# Patient Record
Sex: MALE | Race: WHITE
[De-identification: names, ages, dates, MRNs, and addresses within clinical notes are randomized per-mention and may not be internally consistent; named-entity substitution may affect disease eponyms.]

---

## 2017-02-14 NOTE — [facility]
BRAIN WO/W CONTRAST 

 

 

Indication: Reason For Study Reason: FOLLOW UP BRAIN MASS WITH SURGERY / 

Spl. Instructions: Prev CT and MR sent to PACS. 8cc Gadavist / History: 

 

 

COMPARISON: MRI brain from 11/15/2016 

 

 

TECHNIQUE: Axial diffusion weighted imaging was obtained. Additional 

sagittal T1, axial T1, axial FLAIR, and axial T2 weighted imaging of the 

brain was also performed. Postcontrast T1 weighted imaging was also 

performed after intravenous administration of gadolinium based contrast. 

 

FINDINGS: 

There are postsurgical changes of a suboccipital craniotomy. There has 

been at least partial resection of the right cerebellar cystic mass. There

is however, some residual enhancement is noted along the inferior margin 

of the operative bed. There also some residual blood products along the 

margin of the operative bed as well. There is no surrounding vasogenic 

edema. No new enhancing lesion is identified. No mass effect upon the 4th 

ventricle. No hydrocephalus. Arterial flow voids at the level of the skull

base are maintained. Globes and orbits are within normal limits. Paranasal

sinuses and mastoid air cells are clear. 

 

Impression: 

 

Postsurgical changes from a suboccipital craniotomy and resection of the 

right cerebellar mass. There is some residual enhancement along the 

inferior margin of the operative bed. Continued followup is recommended. 

No mass effect or abnormal edema. 

 

 

Electronically signed by: Mike Montejo (Feb 14, 2017 13:13:17)

## 2017-03-16 VITALS
DIASTOLIC BLOOD PRESSURE: 91 MMHG | DIASTOLIC BLOOD PRESSURE: 91 MMHG | SYSTOLIC BLOOD PRESSURE: 146 MMHG | SYSTOLIC BLOOD PRESSURE: 146 MMHG

## 2017-03-16 NOTE — PHYS DOC
Past Medical History


Past Medical History:  Hypertension, Kidney Stone, Other


Additional Past Medical Histor:  Rheumatoid arthritis, brain tumor


Past Surgical History:  Other


Additional Past Surgical Histo:  brain tumor


Alcohol Use:  None


Drug Use:  None





Adult General


Chief Complaint


Chief Complaint:  NEURO SYMPTOMS/DEFICITS





HPI


HPI


Patient is a 67  year old male who presents with complaint of slurring of 

speech and confusion started approximately 10:45 AM. The patient was coming to 

[facility] for continued therapy. Patient has history of right 

cerebellar tumor status post resection in 2016. Patient states that he 

has had residual problems with right hand shaking and balance problems ever 

since. The patient states that he currently feels better right now. The patient'

s wife states that his speech still seems to be altered from his baseline, 

stating that "he seems sleepy when he talks." Upon arrival to physical therapy, 

the patient was ordered to come to the emergency department for evaluation by 

the therapist due to his slurring of speech. The patient denies any other 

associated symptoms at this time. Patient states that he feels anxious and 

thinks that this may be playing a role in his symptoms.





Review of Systems


Review of Systems





Constitutional: Denies fever or chills []


Eyes: Denies change in visual acuity, redness, or eye pain []


HENT: Denies nasal congestion or sore throat []


Respiratory: Denies cough or shortness of breath []


Cardiovascular: Denies chest pain or edema []


GI: Denies abdominal pain, nausea, vomiting, bloody stools or diarrhea []


: Denies dysuria or hematuria []


Musculoskeletal: Denies back pain or joint pain []


Integument: Denies rash or skin lesions []


Neurologic: Slurring of speech, tingling of scalp []





Current Medications


Current Medications





 Current Medications








 Medications


  (Trade)  Dose


 Ordered  Sig/Stephanie  Start Time


 Stop Time Status Last Admin


Dose Admin


 


 Hydralazine HCl


  (Apresoline)  10 mg  1X  ONCE  3/16/17 12:00


 3/16/17 12:01 DC  


 


 


 Sodium Chloride


  (Iv Sodium


 Chloride 0.9%


 1000ml Bag)  1,000 ml @ 


 100 mls/hr  Q10H  3/16/17 11:52


 3/16/17 13:50 DC  


 











Allergies


Allergies





 Allergies








Coded Allergies Type Severity Reaction Last Updated Verified


 


  No Known Drug Allergies    8/11/15 No











Physical Exam


Physical Exam





Constitutional: Alert, afebrile, no acute distress. []


HENT: Normocephalic, atraumatic, bilateral external ears normal, oropharynx 

moist, no oral exudates, nose normal. []


Eyes: PERRLA, EOMI, conjunctiva normal, no discharge. [] 


Neck: Normal range of motion, no tenderness, supple, no stridor. [] 


Cardiovascular:Heart rate regular rhythm, no murmur []


Lungs & Thorax:  Bilateral breath sounds clear to auscultation []


Abdomen: Bowel sounds normal, soft, no tenderness, no masses, no pulsatile 

masses. [] 


Skin: Warm, dry, no erythema, no rash. [] 


Back: No tenderness, no CVA tenderness. [] 


Extremities: No tenderness, no cyanosis, no clubbing, ROM intact, no edema. [] 


Neurologic: Alert and oriented X 3, normal motor function, normal sensory 

function, intention tremor in the right upper extremity. []





Current Patient Data


Vital Signs





 Vital Signs








  Date Time  Temp Pulse Resp B/P Pulse Ox O2 Delivery O2 Flow Rate FiO2


 


3/16/17 13:32  66 18 146/91 96 Room Air  


 


3/16/17 11:41 98.4       





 98.4       








Lab Values





 Laboratory Tests








Test


  3/16/17


11:51 3/16/17


12:30 3/16/17


12:50


 


Glucose (Fingerstick)


  137mg/dL


(70-99)  H 


  


 


 


White Blood Count


  


  5.7x10^3/uL


(4.0-11.0) 


 


 


Red Blood Count


  


  5.40x10^6/uL


(4.30-5.70) 


 


 


Hemoglobin


  


  14.7g/dL


(13.0-17.5) 


 


 


Hematocrit


  


  44.0%


(39.0-53.0) 


 


 


Mean Corpuscular Volume  82fL ()   


 


Mean Corpuscular Hemoglobin  27pg (25-35)   


 


Mean Corpuscular Hemoglobin


Concent 


  33g/dL (31-37)


  


 


 


Red Cell Distribution Width


  


  13.9%


(11.5-14.5) 


 


 


Platelet Count


  


  157x10^3/uL


(140-400) 


 


 


Neutrophils (%) (Auto)  49% (31-73)   


 


Lymphocytes (%) (Auto)  35% (24-48)   


 


Monocytes (%) (Auto)  12% (0-9)  H 


 


Eosinophils (%) (Auto)  3% (0-3)   


 


Basophils (%) (Auto)  1% (0-3)   


 


Neutrophils # (Auto)


  


  2.8x10^3uL


(1.8-7.7) 


 


 


Lymphocytes # (Auto)


  


  2.0x10^3/uL


(1.0-4.8) 


 


 


Monocytes # (Auto)


  


  0.7x10^3/uL


(0.0-1.1) 


 


 


Eosinophils # (Auto)


  


  0.2x10^3/uL


(0.0-0.7) 


 


 


Basophils # (Auto)


  


  0.1x10^3/uL


(0.0-0.2) 


 


 


Prothrombin Time


  


  12.5SEC


(11.7-14.0) 


 


 


Prothrombin Time INR  1.0 (0.8-1.1)   


 


PTT  27SEC (24-38)   


 


Sodium Level


  


  145mmol/L


(136-145) 


 


 


Potassium Level


  


  4.0mmol/L


(3.5-5.1) 


 


 


Chloride Level


  


  108mmol/L


()  H 


 


 


Carbon Dioxide Level


  


  27mmol/L


(21-32) 


 


 


Anion Gap  10 (6-14)   


 


Blood Urea Nitrogen


  


  16mg/dL (8-26)


  


 


 


Creatinine


  


  0.8mg/dL


(0.7-1.3) 


 


 


Estimated GFR


(Cockcroft-Gault) 


  97.0  


  


 


 


BUN/Creatinine Ratio  20 (6-20)   


 


Glucose Level


  


  141mg/dL


(70-99)  H 


 


 


Calcium Level


  


  9.2mg/dL


(8.5-10.1) 


 


 


Magnesium Level


  


  1.9mg/dL


(1.8-2.4) 


 


 


Total Bilirubin


  


  0.5mg/dL


(0.2-1.0) 


 


 


Aspartate Amino Transferase


(AST) 


  29U/L (15-37)  


  


 


 


Alanine Aminotransferase (ALT)  42U/L (16-63)   


 


Alkaline Phosphatase


  


  108U/L


() 


 


 


Total Protein


  


  6.6g/dL


(6.4-8.2) 


 


 


Albumin


  


  3.9g/dL


(3.4-5.0) 


 


 


Albumin/Globulin Ratio  1.4 (1.0-1.7)   


 


Urine Collection Type   Unknown  


 


Urine Color   Yellow  


 


Urine Clarity   Clear  


 


Urine pH   6.5  


 


Urine Specific Gravity   1.010  


 


Urine Protein


  


  


  Negativemg/dL


(NEG-TRACE)


 


Urine Glucose (UA)


  


  


  Negativemg/dL


(NEG)


 


Urine Ketones (Stick)


  


  


  Negativemg/dL


(NEG)


 


Urine Blood


  


  


  Negative (NEG)


 


 


Urine Nitrite


  


  


  Negative (NEG)


 


 


Urine Bilirubin


  


  


  Negative (NEG)


 


 


Urine Urobilinogen Dipstick


  


  


  0.2mg/dL (0.2


mg/dL)


 


Urine Leukocyte Esterase


  


  


  Negative (NEG)


 


 


Urine RBC   0/HPF (0-2)  


 


Urine WBC   0/HPF (0-4)  


 


Urine Bacteria   0/HPF (0-FEW)  


 


Urine Opiates Screen   Neg (NEG)  


 


Urine Methadone Screen   Neg (NEG)  


 


Urine Barbiturates   Neg (NEG)  


 


Urine Phencyclidine Screen   Neg (NEG)  


 


Urine


Amphetamine/Methamphetamine 


  


  Neg (NEG)  


 


 


Urine Benzodiazepines Screen   Neg (NEG)  


 


Urine Cocaine Screen   Neg (NEG)  


 


Urine Cannabinoids Screen   Neg (NEG)  


 


Urine Ethyl Alcohol   Neg (NEG)  





 Laboratory Tests


3/16/17 12:30








 Laboratory Tests


3/16/17 12:30











EKG


EKG


Interpreted by me: Heart rate 69, sinus rhythm, normal intervals, normal axis, 

no acute ST/T-wave abnormalities present []





Radiology/Procedures


Radiology/Procedures





 [facility]


 [address] [place] [address]


 [phone]


 


 IMAGING REPORT





 Signed





PATIENT: JOVAN HEARN ACCOUNT: [id number] MRN#: [id number]


: 1950 LOCATION: ER AGE: 67


SEX: M EXAM DT: 17 ACCESSION#: [id number]


STATUS: REG ER ORD. PHYSICIAN: KOURTNEY PA MD 


REASON: code stroke


PROCEDURE: HEAD WO CONTRAST











Examination: CT head without contrast





History: History of code stroke.





 Comparison: MRI brain from 2017








PQRS Compliance Statement:





One or more of the following individualized dose reduction techniques were


utilized for this examination:


1. Automated exposure control


2. Adjustment of the mA and/or kV according to patient size


3. Use of iterative reconstruction technique





Findings:





There is no evidence of midline shift. There is no acute intracranial bleed or


extra axial fluid collection identified.





Linear hypodensity identified in the right cerebellum  similar to prior MRI


likely surgical changes of suboccipital craniotomy and resection of


right-sided cerebellar mass identified. Mild bilateral periventricular white


matter hypodensities likely chronic small vessel ischemic disease.





The visualized lateral ventricle, third ventricle, fourth ventricle are


appropriate for age.





The visualized paranasal sinuses, mastoid air cells are clear





Impression:





Prior changes of suboccipital craniotomy and resection of right cerebellar


mass. Hypodensity identified in the right cerebellum likely prior surgical


changes. Otherwise no acute intracranial findings.





Report was called to ER at time dictation.


























DICTATED and SIGNED BY:     SAMIR SALDIVAR MD


DATE:     17 1148





CC: KOURTNEY PA MD; LAUREEN CAMPBELL MD ~


[]





Course & Med Decision Making


Course & Med Decision Making


Pertinent Labs and Imaging studies reviewed. (See chart for details)





Patient's NIH score was 0 in the emergency department. Patient was observed 

with no further episodes observed. The patient's head CT showed no acute 

findings and patient's lab work is unremarkable. The patient's symptoms are 

likely sequelae from recent brain surgery. I spoke with Dr. Childs who 

agreed that this was a reasonable assessment. He recommended that the patient 

be on 81 mg aspirin daily and recommended follow-up in his clinic in the next 2 

weeks. Spoke with the patient regarding plan of care and he is in agreement at 

this time. Advised return to the emergency department for any worsening 

symptoms. Patient voiced understanding and in agreement with treatment plan.





Dragon Disclaimer


Dragon Disclaimer


This electronic medical record was generated, in whole or in part, using a 

voice recognition dictation system.





Departure


Departure


Impression:  


 Primary Impression:  


 Status post craniectomy


 Additional Impression:  


 Confusion


Disposition:  01 HOME, SELF-CARE


Condition:  IMPROVED


Referrals:  


LAUREEN CAMPBELL MD (PCP)








ALEX CHILDS MD


Patient Instructions:  Confusion





Additional Instructions:


Follow-up with Dr. Childs in the next 2 weeks. Return to the emergency 

department for any worsening symptoms.





Problem Qualifiers








KOURTNEY PA MD Mar 16, 2017 12:03

## 2017-03-16 NOTE — RAD
Examination: CT head without contrast



History: History of code stroke.



 Comparison: MRI brain from 2/14/2017





PQRS Compliance Statement:



One or more of the following individualized dose reduction techniques were

utilized for this examination:

1. Automated exposure control

2. Adjustment of the mA and/or kV according to patient size

3. Use of iterative reconstruction technique



Findings:



There is no evidence of midline shift. There is no acute intracranial bleed or

extra axial fluid collection identified.



Linear hypodensity identified in the right cerebellum  similar to prior MRI

likely surgical changes of suboccipital craniotomy and resection of

right-sided cerebellar mass identified. Mild bilateral periventricular white

matter hypodensities likely chronic small vessel ischemic disease.



The visualized lateral ventricle, third ventricle, fourth ventricle are

appropriate for age.



The visualized paranasal sinuses, mastoid air cells are clear



Impression:



Prior changes of suboccipital craniotomy and resection of right cerebellar

mass. Hypodensity identified in the right cerebellum likely prior surgical

changes. Otherwise no acute intracranial findings.



Report was called to ER at time dictation.

## 2017-03-16 NOTE — RAD
Examination: Single frontal of  the chest



History: History of slurred speech, lightheadedness



Comparison: None available



Findings:



 The cardiomediastinal silhouette grossly appears unremarkable. There is no

acute infiltrate or visualized pneumothorax identified.



Impression



No acute cardiopulmonary findings.

## 2017-03-17 NOTE — EKG
[facility]

               [address] [place] [address]

Test Date:    2017               Test Time:    11:15:58

Pat Name:     JOVAN HEARN           Department:   

Patient ID:   [id number]           Room:          

Gender:       M                        Technician:   

:          1950               Requested By: KOURTNEY PA

Order Number: [id number]            Reading MD:   Keli Rabago

                                 Measurements

Intervals                              Axis          

Rate:         69                       P:            45

NH:           154                      QRS:          1

QRSD:         94                       T:            26

QT:           420                                    

QTc:          452                                    

                           Interpretive Statements

SINUS RHYTHM

QRS(T) CONTOUR ABNORMALITY



CONSIDER INFERIOR MYOCARDIAL DAMAGE

POSSIBLY ABNORMAL ECG

RI6.01

Compared to ECG 2016 11:45:04

No significant changes



Electronically Signed On 3- 20:01:27 CDT by Keli Rabago

## 2017-05-11 ENCOUNTER — HOSPITAL ENCOUNTER (OUTPATIENT)
Dept: HOSPITAL 61 - MRI | Age: 67
Discharge: HOME | End: 2017-05-11
Attending: NEUROLOGICAL SURGERY
Payer: COMMERCIAL

## 2017-05-11 DIAGNOSIS — G93.9: Primary | ICD-10-CM

## 2017-05-11 LAB
CREAT SERPL-MCNC: 0.9 MG/DL (ref 0.7–1.3)
GFR SERPLBLD BASED ON 1.73 SQ M-ARVRAT: 84.7 ML/MIN

## 2017-05-11 PROCEDURE — 82565 ASSAY OF CREATININE: CPT

## 2017-05-11 PROCEDURE — 70553 MRI BRAIN STEM W/O & W/DYE: CPT

## 2017-05-11 PROCEDURE — 36415 COLL VENOUS BLD VENIPUNCTURE: CPT

## 2017-05-11 NOTE — RAD
PROCEDURE 

MRI brain without and with contrast. 

 

HISTORY 

Brain mass, craniotomy 

 

TECHNIQUE 

Multiplanar, multi sequential pre and post contrast MR imaging was 

performed of the brain. 

Contrast: 8 cc Gadavist 

 

COMPARISON 

02/14/2017 

 

FINDINGS 

As seen previously, there has been suboccipital craniotomy. There is again

resection cavity of the right cerebellum. Previously seen enhancement at 

the margin of resection cavity has mostly resolved, mild residual more 

linear appearing enhancement at the superior margin adjacent to the 

tentorium. No new focus of enhancement is identified in this area. There 

is no new cystic signal abnormality in this region. Previously seen FLAIR 

hyperintense signal abnormality of the right cerebellar resection cavity 

has resolved. Degree of hemosiderin deposition about the right cerebellar 

resection cavity is unchanged, also small focus of hemosiderin deposition 

of the right mid brain unchanged. 

There is no new significant edema. Ventricular size is stable, within 

normal limits. There is small right parietal developmental venous anomaly 

as seen previously. There is no midline shift or new extra-axial fluid 

collection. There is no evidence of a recent infarct or cytotoxic edema. 

Cerebellar tonsils are normal in location. There is preservation of marrow

signal of the clivus. There is no significant abnormality of the pineal 

gland or pituitary gland. There is preservation of the major arterial 

intracranial flow voids at the skull base, small right vertebral artery 

flow void. Mastoid air cells are aerated. There is patchy minimal ethmoid 

air cell mucosal thickening. 

 

IMPRESSION 

1. There is again resection cavity of the right cerebellum, previously 

seen enhancement mostly resolved other than mild residual linear appearing

enhancement at the superior margin of the resection cavity near tentorium 

most likely on a post surgical basis. Degree of associated hemosiderin 

deposition is similar. No new abnormal intracranial enhancement is 

identified.

 

 

 

Electronically signed by: Marcelo Acevedo MD (May 11, 2017 16:12:50)

## 2017-09-20 ENCOUNTER — HOSPITAL ENCOUNTER (OUTPATIENT)
Dept: HOSPITAL 61 - KCIC MRI | Age: 67
Discharge: HOME | End: 2017-09-20
Attending: PHYSICIAN ASSISTANT
Payer: COMMERCIAL

## 2017-09-20 DIAGNOSIS — M19.012: Primary | ICD-10-CM

## 2017-09-20 PROCEDURE — 73221 MRI JOINT UPR EXTREM W/O DYE: CPT

## 2017-09-20 NOTE — [facility]
Examination: MRI of the left shoulder without contrast

 

HISTORY: History of left shoulder pain for a few months.

 

COMPARISON: None available

 

TECHNIQUE: Multiplanar, multisequence MR imaging of the left shoulder 

performed without contrast.

 

 

FINDINGS:

 

 

Long head of the biceps tendon is within the bicipital groove. The 

attachment of the long head of biceps tendon to the superior labral anchor

grossly appears intact. The attachment of the subscapularis tendon grossly

appears intact. There is mild increased signal identified in the 

subscapularis tendon likely mild tendinosis.

 

There is small amount of fluid identified in the subacromial subdeltoid 

bursa. There is probably a small focus of full-thickness tear of the 

supraspinatus tendon in the conjoined portion posteriorly best visualized 

on series 7 image #13 and series 8 image #6, measuring about 3 mm in AP 

dimension. There is bursal sided fraying of the supraspinatus tendon 

anteriorly. Examination is somewhat limited due to mild motion artifact.

 

There is moderate tendinosis of the supraspinatus, infraspinatus tendons. 

The attachment of the teres minor tendon grossly appears intact.

 

There is mild increased signal identified throughout the labrum likely 

secondary to degeneration.

 

Moderate acromioclavicular joint osteoarthrosis. The acromion is type II.

 

The muscle bulk grossly appears unremarkable.

 

Moderate degenerative changes in the glenohumeral joint.

 

Mild obliteration of fat in the rotator interval. 

 

 

IMPRESSION:

 

 

1. Small amount of fluid identified in the subacromial subdeltoid bursa 

probably small focus of full-thickness tear of the supraspinatus tendon 

possibly in the conjoined portion. There is bursal sided fraying of the 

supraspinatus tendon anteriorly.

 

2. Tendinosis of supraspinatus and intraspinous tendons.

 

3. Moderate degenerative changes acromioclavicular joint and the 

glenohumeral joint.

 

4. Some obvious of fat in the rotator interval. Correlate for adhesive 

capsulitis.

 

Electronically signed by: Wilton Winn MD (9/20/2017 2:28 PM) [id number]